# Patient Record
Sex: FEMALE | Race: BLACK OR AFRICAN AMERICAN | Employment: UNEMPLOYED | URBAN - METROPOLITAN AREA
[De-identification: names, ages, dates, MRNs, and addresses within clinical notes are randomized per-mention and may not be internally consistent; named-entity substitution may affect disease eponyms.]

---

## 2023-01-31 ENCOUNTER — MEDICAL CORRESPONDENCE (OUTPATIENT)
Dept: HEALTH INFORMATION MANAGEMENT | Facility: CLINIC | Age: 38
End: 2023-01-31

## 2023-01-31 ENCOUNTER — ANCILLARY ORDERS (OUTPATIENT)
Dept: GENERAL RADIOLOGY | Facility: CLINIC | Age: 38
End: 2023-01-31

## 2023-01-31 DIAGNOSIS — M25.552 LEFT HIP PAIN: ICD-10-CM

## 2024-09-12 ENCOUNTER — APPOINTMENT (OUTPATIENT)
Dept: GENERAL RADIOLOGY | Facility: CLINIC | Age: 39
End: 2024-09-12
Attending: STUDENT IN AN ORGANIZED HEALTH CARE EDUCATION/TRAINING PROGRAM
Payer: COMMERCIAL

## 2024-09-12 ENCOUNTER — HOSPITAL ENCOUNTER (EMERGENCY)
Facility: CLINIC | Age: 39
Discharge: HOME | End: 2024-09-13
Attending: STUDENT IN AN ORGANIZED HEALTH CARE EDUCATION/TRAINING PROGRAM | Admitting: STUDENT IN AN ORGANIZED HEALTH CARE EDUCATION/TRAINING PROGRAM
Payer: COMMERCIAL

## 2024-09-12 DIAGNOSIS — S00.531A CONTUSION OF LIP, INITIAL ENCOUNTER: ICD-10-CM

## 2024-09-12 DIAGNOSIS — S01.511A LIP LACERATION, INITIAL ENCOUNTER: ICD-10-CM

## 2024-09-12 DIAGNOSIS — M79.604 RIGHT LEG PAIN: ICD-10-CM

## 2024-09-12 DIAGNOSIS — M79.621 PAIN OF RIGHT UPPER ARM: ICD-10-CM
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
## 2024-09-12 DIAGNOSIS — M54.2 NECK PAIN: ICD-10-CM

## 2024-09-12 LAB
HCG UR QL: NEGATIVE
INTERNAL QC OK POCT: NORMAL
POCT KIT EXPIRATION DATE: NORMAL
POCT KIT LOT NUMBER: NORMAL

## 2024-09-12 PROCEDURE — 73080 X-RAY EXAM OF ELBOW: CPT | Mod: RT

## 2024-09-12 PROCEDURE — 99284 EMERGENCY DEPT VISIT MOD MDM: CPT | Performed by: STUDENT IN AN ORGANIZED HEALTH CARE EDUCATION/TRAINING PROGRAM

## 2024-09-12 PROCEDURE — 90471 IMMUNIZATION ADMIN: CPT | Performed by: STUDENT IN AN ORGANIZED HEALTH CARE EDUCATION/TRAINING PROGRAM

## 2024-09-12 PROCEDURE — 72040 X-RAY EXAM NECK SPINE 2-3 VW: CPT

## 2024-09-12 PROCEDURE — 90715 TDAP VACCINE 7 YRS/> IM: CPT | Performed by: STUDENT IN AN ORGANIZED HEALTH CARE EDUCATION/TRAINING PROGRAM

## 2024-09-12 PROCEDURE — 99285 EMERGENCY DEPT VISIT HI MDM: CPT | Performed by: STUDENT IN AN ORGANIZED HEALTH CARE EDUCATION/TRAINING PROGRAM

## 2024-09-12 PROCEDURE — 81025 URINE PREGNANCY TEST: CPT | Performed by: STUDENT IN AN ORGANIZED HEALTH CARE EDUCATION/TRAINING PROGRAM

## 2024-09-12 PROCEDURE — 73030 X-RAY EXAM OF SHOULDER: CPT | Mod: RT

## 2024-09-12 PROCEDURE — 250N000013 HC RX MED GY IP 250 OP 250 PS 637: Performed by: STUDENT IN AN ORGANIZED HEALTH CARE EDUCATION/TRAINING PROGRAM

## 2024-09-12 PROCEDURE — 250N000011 HC RX IP 250 OP 636: Performed by: STUDENT IN AN ORGANIZED HEALTH CARE EDUCATION/TRAINING PROGRAM

## 2024-09-12 PROCEDURE — 73562 X-RAY EXAM OF KNEE 3: CPT | Mod: RT

## 2024-09-12 RX ORDER — IBUPROFEN 600 MG/1
600 TABLET, FILM COATED ORAL ONCE
Status: COMPLETED | OUTPATIENT
Start: 2024-09-12 | End: 2024-09-12

## 2024-09-12 RX ORDER — ACETAMINOPHEN 500 MG
1000 TABLET ORAL ONCE
Status: COMPLETED | OUTPATIENT
Start: 2024-09-12 | End: 2024-09-12

## 2024-09-12 RX ADMIN — CLOSTRIDIUM TETANI TOXOID ANTIGEN (FORMALDEHYDE INACTIVATED), CORYNEBACTERIUM DIPHTHERIAE TOXOID ANTIGEN (FORMALDEHYDE INACTIVATED), BORDETELLA PERTUSSIS TOXOID ANTIGEN (GLUTARALDEHYDE INACTIVATED), BORDETELLA PERTUSSIS FILAMENTOUS HEMAGGLUTININ ANTIGEN (FORMALDEHYDE INACTIVATED), BORDETELLA PERTUSSIS PERTACTIN ANTIGEN, AND BORDETELLA PERTUSSIS FIMBRIAE 2/3 ANTIGEN 0.5 ML: 5; 2; 2.5; 5; 3; 5 INJECTION, SUSPENSION INTRAMUSCULAR at 22:49

## 2024-09-12 RX ADMIN — IBUPROFEN 600 MG: 600 TABLET, FILM COATED ORAL at 22:48

## 2024-09-12 RX ADMIN — ACETAMINOPHEN 1000 MG: 500 TABLET ORAL at 22:48

## 2024-09-12 ASSESSMENT — COLUMBIA-SUICIDE SEVERITY RATING SCALE - C-SSRS
1. IN THE PAST MONTH, HAVE YOU WISHED YOU WERE DEAD OR WISHED YOU COULD GO TO SLEEP AND NOT WAKE UP?: NO
2. HAVE YOU ACTUALLY HAD ANY THOUGHTS OF KILLING YOURSELF IN THE PAST MONTH?: NO
6. HAVE YOU EVER DONE ANYTHING, STARTED TO DO ANYTHING, OR PREPARED TO DO ANYTHING TO END YOUR LIFE?: NO

## 2024-09-12 ASSESSMENT — ACTIVITIES OF DAILY LIVING (ADL)
ADLS_ACUITY_SCORE: 35
ADLS_ACUITY_SCORE: 33

## 2024-09-13 VITALS
WEIGHT: 143.4 LBS | SYSTOLIC BLOOD PRESSURE: 112 MMHG | TEMPERATURE: 98.2 F | DIASTOLIC BLOOD PRESSURE: 55 MMHG | RESPIRATION RATE: 16 BRPM | OXYGEN SATURATION: 99 % | HEART RATE: 72 BPM

## 2024-09-13 ASSESSMENT — ACTIVITIES OF DAILY LIVING (ADL): ADLS_ACUITY_SCORE: 35

## 2024-09-13 NOTE — DISCHARGE INSTRUCTIONS
Thank you for coming to the [facility].  Your clinical examination and x-rays are reassuring against dangerous injury.  The swelling and cuts on your lip are already in the process of healing and should come down with time.  You can use some ice packs (wrapped, do not apply ice to your bare skin) and Tylenol and Motrin.    Please follow-up with your primary care doctor if your symptoms are not improving.    If you develop serious or concerning symptoms please feel free to return to the ED at any time.

## 2024-09-13 NOTE — ED TRIAGE NOTES
Triage Assessment (Adult)       Row Name 09/12/24 2115          Triage Assessment    Airway WDL WDL        Respiratory WDL    Respiratory WDL WDL        Skin Circulation/Temperature WDL    Skin Circulation/Temperature WDL WDL        Cardiac WDL    Cardiac WDL WDL        Peripheral/Neurovascular WDL    Peripheral Neurovascular WDL WDL        Cognitive/Neuro/Behavioral WDL    Cognitive/Neuro/Behavioral WDL WDL

## 2024-09-13 NOTE — ED TRIAGE NOTES
Fell last night thought she was okay but today having neck and R shoulder pain also mouth soreness that is causing her issues with eating.     Fell in the [place] coming out of her apt and thinks her foot got caught on the ground causing her to fall.  Denies LOC. 8/10 pain. Denies vision change or nausea.

## 2024-09-13 NOTE — ED PROVIDER NOTES
ED Provider Note  [facility]      History     Chief Complaint   Patient presents with    Fall     HPI  Niko Davison is a 39 year old female who presents to the ER for evaluation after a fall.    Pt states that she had a fall and fell face forward. She has pain in her mouth and R shoulder, neck, and knee. She did hit her head but did not lose consciousness. She is able to walk. She no pain with breathing. She denies vomiting or vision changes. She could not eat earlier due to the mouth pain, has not seen her dentist yet.     Past Medical History  History reviewed. No pertinent past medical history.  History reviewed. No pertinent surgical history.  No current outpatient medications on file.    No Known Allergies  Family History  History reviewed. No pertinent family history.  Social History   Social History     Tobacco Use    Smoking status: Never    Smokeless tobacco: Never   Substance Use Topics    Alcohol use: Never    Drug use: Never      A medically appropriate review of systems was performed with pertinent positives and negatives noted in the HPI, and all other systems negative.    Physical Exam   BP: 117/77  Pulse: 80  Temp: 97.9  F (36.6  C)  Resp: 18  Weight: 65 kg (143 lb 6.4 oz)  SpO2: 96 %  Physical Exam  Vital Signs Reviewed  Gen: Well nourished, well developed, resting comfortably, no acute distress  HEENT: NC/AT, PERRL, EOMI, MMM.  Healing lacerations and contusion to upper lip.  No through and through.  No loose teeth.  Neck: Supple, FROM, minimal tenderness to paraspinal musculature with no bony tenderness step-off or deformity  CV: Regular Rate  Lungs/Chest: Normal Effort  Abd: Non-distended  MSK/Back: FROM, no visible deformity.  Minimal tenderness to right shoulder right elbow right knee.  Neuro: A&Ox3, GCS 15, CN II-XII unremarkable. Strength and sensation globally intact.  Skin: Warm, Dry, Intact, no visible lesions      ED Course, Procedures, & Data       Procedures                Results for orders placed or performed during the hospital encounter of 09/12/24   XR Shoulder Right G/E 3 Views     Status: None    Narrative    EXAM: XR SHOULDER RIGHT G/E 3 VIEWS  LOCATION: [facility]  DATE: 9/12/2024    INDICATION: Fall, pain  COMPARISON: None.      Impression    IMPRESSION: Normal joint spaces and alignment. No fracture.   Elbow XR, G/E 3 views, right     Status: None    Narrative    EXAM: XR ELBOW RIGHT G/E 3 VIEWS  LOCATION: [facility]  DATE: 9/12/2024    INDICATION: Pain, fall  COMPARISON: None.      Impression    IMPRESSION: Normal joint spaces and alignment. No fracture or joint effusion.   XR Knee Right 3 Views     Status: None    Narrative    EXAM: XR KNEE RIGHT 3 VIEWS  LOCATION: [facility]  DATE: 9/12/2024    INDICATION: pain, fall  COMPARISON: None.      Impression    IMPRESSION: Normal joint spaces and alignment. No fracture or joint effusion.   Cervical spine XR, 2-3 views     Status: None    Narrative    EXAM: XR CERVICAL SPINE 2/3 VIEWS  LOCATION: [facility]  DATE: 9/12/2024    INDICATION: Mild pain, fall  COMPARISON: None.      Impression    IMPRESSION: No fracture. Normal vertebral heights. Straightening of the normal cervical lordosis. Normal disc spaces and facets for age. Normal extraspinal structures.       hCG qual urine POCT     Status: Normal   Result Value Ref Range    HCG Qual Urine Negative Negative    Internal QC Check POCT Valid Valid    POCT Kit Lot Number [id number]     POCT Kit Expiration Date 12/17/2025      Medications   acetaminophen (TYLENOL) tablet 1,000 mg (1,000 mg Oral $Given 9/12/24 2248)   ibuprofen (ADVIL/MOTRIN) tablet 600 mg (600 mg Oral $Given 9/12/24 2248)   Tdap (tetanus-diphtheria-acell pertussis) (ADACEL) injection 0.5 mL (0.5 mLs  Intramuscular $Given 9/12/24 6344)     Labs Ordered and Resulted from Time of ED Arrival to Time of ED Departure   HCG QUALITATIVE URINE POCT - Normal       Result Value    HCG Qual Urine Negative      Internal QC Check POCT Valid      POCT Kit Lot Number [id number]      POCT Kit Expiration Date 12/17/2025       XR Knee Right 3 Views   Final Result   IMPRESSION: Normal joint spaces and alignment. No fracture or joint effusion.      Cervical spine XR, 2-3 views   Final Result   IMPRESSION: No fracture. Normal vertebral heights. Straightening of the normal cervical lordosis. Normal disc spaces and facets for age. Normal extraspinal structures.            Elbow XR, G/E 3 views, right   Final Result   IMPRESSION: Normal joint spaces and alignment. No fracture or joint effusion.      XR Shoulder Right G/E 3 Views   Final Result   IMPRESSION: Normal joint spaces and alignment. No fracture.             Critical care was not performed.     Medical Decision Making  The patient's presentation was of moderate complexity (an undiagnosed new problem with uncertain prognosis).    The patient's evaluation involved:  ordering and/or review of 3+ test(s) in this encounter (see separate area of note for details)    The patient's management necessitated moderate risk (prescription drug management including medications given in the ED).    Assessment & Plan    This is a otherwise reportedly healthy 39-year-old female presenting to the ED for evaluation after a fall.  Patient is afebrile with reassuring vital signs.  Resting comfortably no acute distress.  Relatively reassuring physical examination.  There not appear does not appear to be indication for head or neck CT based on clinical examination and story.  X-rays of the cervical spine right shoulder right elbow and right knee were obtained and are negative.  Examination does not suggest other radiographs needed at this time.  Patient is feeling somewhat better after pain medication.   Tetanus appears out of date via state database and was updated.  Lip is without lacerations requiring repair.  Appear to be healing.  Discussed that lip swelling should go down with time.      Recommend follow-up with dentist regarding dental pain.  No indication for splinting or other emergent management in the ED.    Patient and family comfortable with recommended plan for outpatient follow-up, pain control and return precautions to ED if new or concerning symptoms develop.    I have reviewed the nursing notes. I have reviewed the findings, diagnosis, plan and need for follow up with the patient.    New Prescriptions    No medications on file       Final diagnoses:   Accidental fall, initial encounter   Contusion of lip, initial encounter   Lip laceration, initial encounter   Neck pain   Pain of right upper arm   Right leg pain     I, Jeanette Jordan, am serving as a trained medical scribe to document services personally performed by Satya Salazar MD, based on the provider's statements to me.     ISatya MD, was physically present and have reviewed and verified the accuracy of this note documented by Jeanette Jordan.    Satya Salazar MD  [facility] EMERGENCY DEPARTMENT  9/12/2024     Satya Salazar MD  09/13/24 0110